# Patient Record
Sex: MALE | URBAN - METROPOLITAN AREA
[De-identification: names, ages, dates, MRNs, and addresses within clinical notes are randomized per-mention and may not be internally consistent; named-entity substitution may affect disease eponyms.]

---

## 2017-01-13 ENCOUNTER — IMPORTED ENCOUNTER (OUTPATIENT)
Dept: URBAN - METROPOLITAN AREA CLINIC 9 | Facility: CLINIC | Age: 52
End: 2017-01-13

## 2017-04-11 ENCOUNTER — IMPORTED ENCOUNTER (OUTPATIENT)
Dept: URBAN - METROPOLITAN AREA CLINIC 9 | Facility: CLINIC | Age: 52
End: 2017-04-11

## 2018-02-15 ENCOUNTER — IMPORTED ENCOUNTER (OUTPATIENT)
Dept: URBAN - METROPOLITAN AREA CLINIC 9 | Facility: CLINIC | Age: 53
End: 2018-02-15

## 2018-10-19 ENCOUNTER — IMPORTED ENCOUNTER (OUTPATIENT)
Dept: URBAN - METROPOLITAN AREA CLINIC 9 | Facility: CLINIC | Age: 53
End: 2018-10-19

## 2019-02-26 ENCOUNTER — IMPORTED ENCOUNTER (OUTPATIENT)
Dept: URBAN - METROPOLITAN AREA CLINIC 9 | Facility: CLINIC | Age: 54
End: 2019-02-26

## 2019-04-08 NOTE — PATIENT DISCUSSION
MYOPIA , OU- DISC OPT OF REFRACTIVE SX-VS-GLS/BDSA-HG-GFLOSM. PT UNDERSTANDS OPTIONS AND DESIRES TO PROCEED WITH REFRACTIVE SX TO IMPROVE VA AND REDUCE DEPENDENCY ON GLS/CTLS.

## 2019-04-08 NOTE — PATIENT DISCUSSION
HIGH MYOPIA, OS- DISCUSSED INCREASED RISK OF NEEDING A ENHANCEMENT AND HALO'S AROUND LIGHTS AT NIGHT.

## 2019-04-08 NOTE — PATIENT DISCUSSION
PT. CONFIRMS SATISFACTORY VISION WITH TRIAL FRAME ABOVE,  PREFERS NO CYL OS (challenged) AND DESIRES TO PROCEED WITH TREATING ABOVE RX, OU

## 2019-04-11 NOTE — PATIENT DISCUSSION
MYOPIA, OU- DISC OPT OF REFRACTIVE SX-VS-GLS/[id number]-[id number]. PT UNDERSTANDS OPTIONS AND DESIRES TO PROCEED WITH REFRACTIVE SX TO IMPROVE VA AND REDUCE DEPENDENCY ON GLS/CTLS.

## 2019-09-17 ENCOUNTER — IMPORTED ENCOUNTER (OUTPATIENT)
Dept: URBAN - METROPOLITAN AREA CLINIC 9 | Facility: CLINIC | Age: 54
End: 2019-09-17

## 2021-02-05 ENCOUNTER — IMPORTED ENCOUNTER (OUTPATIENT)
Dept: URBAN - METROPOLITAN AREA CLINIC 9 | Facility: CLINIC | Age: 56
End: 2021-02-05

## 2021-10-18 ASSESSMENT — KERATOMETRY
OS_K1POWER_DIOPTERS: 43.75
OS_AXISANGLE2_DEGREES: 16
OD_AXISANGLE_DEGREES: 133
OD_AXISANGLE2_DEGREES: 43
OS_K2POWER_DIOPTERS: 44.25
OD_K1POWER_DIOPTERS: 43.5
OD_K2POWER_DIOPTERS: 45.25
OS_AXISANGLE_DEGREES: 106

## 2021-10-18 ASSESSMENT — VISUAL ACUITY
OS_CC: 20/20 SN
OD_CC: 20/20 - SN
OD_SC: 20/20 -2 SN
OS_SC: 20/20 -2 SN
OD_SC: 20/25 + SN
OD_SC: 20/25 SN
OS_SC: 20/20 SN
OS_SC: 20/25 + SN
OS_CC: 20/20 SN
OS_CC: 20/20 SN
OS_SC: 20/25 -2 SN
OD_CC: 20/20 -2 SN
OS_CC: 20/20 SN
OD_CC: 20/25 SN
OD_SC: 20/20 SN
OD_CC: 20/25 -2 SN

## 2021-10-18 ASSESSMENT — TONOMETRY
OD_IOP_MMHG: 16
OS_IOP_MMHG: 14
OD_IOP_MMHG: 15
OD_IOP_MMHG: 16
OD_IOP_MMHG: 18
OD_IOP_MMHG: 15
OS_IOP_MMHG: 14
OS_IOP_MMHG: 20
OD_IOP_MMHG: 15
OS_IOP_MMHG: 13
OS_IOP_MMHG: 14
OS_IOP_MMHG: 11
OD_IOP_MMHG: 22
OS_IOP_MMHG: 15

## 2022-01-20 ENCOUNTER — PREPPED CHART (OUTPATIENT)
Dept: URBAN - METROPOLITAN AREA CLINIC 4 | Facility: CLINIC | Age: 57
End: 2022-01-20

## 2022-06-24 RX ORDER — TADALAFIL 5 MG/1
TABLET ORAL
COMMUNITY
End: 2022-10-25

## 2022-06-24 RX ORDER — PROMETHAZINE HYDROCHLORIDE 25 MG/1
TABLET ORAL
COMMUNITY
Start: 2022-01-21 | End: 2023-03-27

## 2022-06-24 RX ORDER — SUMATRIPTAN 25 MG/1
TABLET, FILM COATED ORAL
COMMUNITY
Start: 2022-01-21

## 2022-06-24 RX ORDER — ATORVASTATIN CALCIUM 40 MG/1
TABLET, FILM COATED ORAL
COMMUNITY

## 2022-08-25 PROBLEM — M25.562 LEFT KNEE PAIN: Status: ACTIVE | Noted: 2022-08-25

## 2022-08-25 PROBLEM — E66.9 OBESITY, UNSPECIFIED: Status: ACTIVE | Noted: 2022-08-25

## 2022-08-25 PROBLEM — M17.12 PRIMARY OSTEOARTHRITIS OF LEFT KNEE: Status: ACTIVE | Noted: 2022-08-25

## 2022-08-25 PROBLEM — I10 BENIGN ESSENTIAL HTN: Status: ACTIVE | Noted: 2022-08-25

## 2022-08-25 PROBLEM — S92.309A CLOSED FRACTURE OF METATARSAL BONE: Status: ACTIVE | Noted: 2022-08-25

## 2022-08-25 PROBLEM — I44.0 FIRST DEGREE AV BLOCK: Status: ACTIVE | Noted: 2022-08-25

## 2022-08-25 PROBLEM — M79.671 RIGHT FOOT PAIN: Status: ACTIVE | Noted: 2022-08-25

## 2022-08-25 PROBLEM — K21.9 GASTROESOPHAGEAL REFLUX DISEASE: Status: ACTIVE | Noted: 2022-08-25

## 2022-08-25 PROBLEM — M20.11 HALLUX VALGUS (ACQUIRED), RIGHT FOOT: Status: ACTIVE | Noted: 2022-08-25

## 2022-08-25 PROBLEM — E78.5 HYPERLIPIDEMIA: Status: ACTIVE | Noted: 2022-08-25

## 2022-08-25 PROBLEM — Z96.651 S/P TOTAL KNEE ARTHROPLASTY, RIGHT: Status: ACTIVE | Noted: 2022-08-25

## 2022-08-25 PROBLEM — E66.9 OBESITY WITH BODY MASS INDEX 30 OR GREATER: Status: ACTIVE | Noted: 2022-08-25

## 2022-08-25 PROBLEM — M20.41 HAMMER TOE OF RIGHT FOOT: Status: ACTIVE | Noted: 2022-08-25

## 2022-08-25 PROBLEM — E78.00 HYPERCHOLESTEROLEMIA: Status: ACTIVE | Noted: 2022-08-25

## 2022-08-25 PROBLEM — G44.209 TENSION TYPE HEADACHE: Status: ACTIVE | Noted: 2022-08-25

## 2022-08-25 PROBLEM — G43.909 MIGRAINE WITHOUT STATUS MIGRAINOSUS, NOT INTRACTABLE: Status: ACTIVE | Noted: 2022-08-25

## 2022-08-25 PROBLEM — G89.29 OTHER CHRONIC PAIN: Status: ACTIVE | Noted: 2022-08-25

## 2022-09-01 PROBLEM — E78.5 DYSLIPIDEMIA: Status: ACTIVE | Noted: 2022-09-01

## 2022-09-01 PROBLEM — Z96.659 ARTIFICIAL KNEE JOINT PRESENT: Status: ACTIVE | Noted: 2022-09-01

## 2022-09-01 PROBLEM — R09.81 SINUS CONGESTION: Status: ACTIVE | Noted: 2022-09-01

## 2022-09-01 PROBLEM — I10 HYPERTENSION: Status: ACTIVE | Noted: 2022-09-01

## 2022-09-01 PROBLEM — M17.11 PRIMARY OSTEOARTHRITIS OF RIGHT KNEE: Status: ACTIVE | Noted: 2022-08-25

## 2022-09-01 PROBLEM — R73.03 PREDIABETES: Status: ACTIVE | Noted: 2022-09-01

## 2022-09-15 PROBLEM — K92.1 BLOOD IN STOOL: Status: ACTIVE | Noted: 2022-09-15

## 2022-09-15 PROBLEM — M54.50 ACUTE BILATERAL LOW BACK PAIN WITHOUT SCIATICA: Status: ACTIVE | Noted: 2022-09-15

## 2024-02-02 ENCOUNTER — ESTABLISHED PATIENT (OUTPATIENT)
Facility: LOCATION | Age: 59
End: 2024-02-02

## 2024-02-02 DIAGNOSIS — H25.13: ICD-10-CM

## 2024-02-02 DIAGNOSIS — H11.001: ICD-10-CM

## 2024-02-02 PROCEDURE — 99204 OFFICE O/P NEW MOD 45 MIN: CPT

## 2024-02-02 PROCEDURE — 92015 DETERMINE REFRACTIVE STATE: CPT

## 2024-02-02 ASSESSMENT — VISUAL ACUITY
OS_GLARE: 20/50
OD_GLARE: 20/60

## 2024-02-02 ASSESSMENT — TONOMETRY
OD_IOP_MMHG: 15
OS_IOP_MMHG: 13

## 2024-04-18 ENCOUNTER — CONTACT LENSES/GLASSES VISIT (OUTPATIENT)
Facility: LOCATION | Age: 59
End: 2024-04-18

## 2024-04-18 DIAGNOSIS — H52.4: ICD-10-CM

## 2024-04-18 PROCEDURE — 99211NC NO CHARGE VISIT
